# Patient Record
(demographics unavailable — no encounter records)

---

## 2020-12-21 RX ORDER — FLUTICASONE PROPIONATE 50 MCG
1 SPRAY, SUSPENSION (ML) NASAL DAILY
OUTPATIENT
Start: 2020-12-21

## 2020-12-21 NOTE — TELEPHONE ENCOUNTER
Fluticasone nasal spray      Last Written Prescription Date:  ?  Last Fill Quantity: ?,   # refills: ?  Last Office Visit: ?  Future Office visit:       Routing refill request to provider for review/approval because:  Drug not active on patient's medication list

## 2020-12-21 NOTE — TELEPHONE ENCOUNTER
Routing refill request to provider for review/approval because:  Drug not active on patient's medication list    Kira Bush RN  [facility]

## 2020-12-21 NOTE — TELEPHONE ENCOUNTER
Reason for Call:  Medication or medication refill:    Do you use a [facility]?  Name of the pharmacy and phone number for the current request:  [facility]    Name of the medication requested: Fluticasone Nasal Spray    Other request: Patient stated that she was on this a long time ago and just ran out. Patient would like a refill. Thank you    Can we leave a detailed message on this number? YES    Phone number patient can be reached at: Cell number on file:  [phone]  No relevant phone numbers on file.       Best Time: anytime    Call taken on 12/21/2020 at 9:12 AM by Kira Nielsen